# Patient Record
Sex: FEMALE | Race: OTHER | NOT HISPANIC OR LATINO | Employment: FULL TIME | URBAN - METROPOLITAN AREA
[De-identification: names, ages, dates, MRNs, and addresses within clinical notes are randomized per-mention and may not be internally consistent; named-entity substitution may affect disease eponyms.]

---

## 2021-10-12 ENCOUNTER — OFFICE VISIT (OUTPATIENT)
Dept: OBSTETRICS AND GYNECOLOGY | Facility: CLINIC | Age: 31
End: 2021-10-12

## 2021-10-12 VITALS
SYSTOLIC BLOOD PRESSURE: 124 MMHG | HEIGHT: 59 IN | DIASTOLIC BLOOD PRESSURE: 89 MMHG | WEIGHT: 176 LBS | BODY MASS INDEX: 35.48 KG/M2

## 2021-10-12 DIAGNOSIS — N94.6 DYSMENORRHEA: ICD-10-CM

## 2021-10-12 DIAGNOSIS — Z12.4 SCREENING FOR CERVICAL CANCER: ICD-10-CM

## 2021-10-12 DIAGNOSIS — N92.0 MENORRHAGIA WITH REGULAR CYCLE: ICD-10-CM

## 2021-10-12 DIAGNOSIS — Z01.419 WELL WOMAN EXAM: Primary | ICD-10-CM

## 2021-10-12 PROCEDURE — 99395 PREV VISIT EST AGE 18-39: CPT | Mod: S$GLB,,, | Performed by: OBSTETRICS & GYNECOLOGY

## 2021-10-12 PROCEDURE — 99395 PR PREVENTIVE VISIT,EST,18-39: ICD-10-PCS | Mod: S$GLB,,, | Performed by: OBSTETRICS & GYNECOLOGY

## 2021-10-12 RX ORDER — TRANEXAMIC ACID 650 MG/1
1300 TABLET ORAL 3 TIMES DAILY
Qty: 30 TABLET | Refills: 5 | Status: SHIPPED | OUTPATIENT
Start: 2021-10-12 | End: 2023-01-24

## 2021-10-15 ENCOUNTER — PATIENT MESSAGE (OUTPATIENT)
Dept: OBSTETRICS AND GYNECOLOGY | Facility: CLINIC | Age: 31
End: 2021-10-15

## 2021-11-03 ENCOUNTER — PATIENT MESSAGE (OUTPATIENT)
Dept: OBSTETRICS AND GYNECOLOGY | Facility: CLINIC | Age: 31
End: 2021-11-03

## 2022-03-16 ENCOUNTER — TELEPHONE (OUTPATIENT)
Dept: OBSTETRICS AND GYNECOLOGY | Facility: CLINIC | Age: 32
End: 2022-03-16

## 2022-03-16 NOTE — TELEPHONE ENCOUNTER
----- Message from Ivis Cheema sent at 3/16/2022 12:40 PM CDT -----  Regarding: Advice  Contact: patient  Patient would like the nurse to give her a call back concerning, the medication Dr Arellano told her she would have to take if she was planning on getting pregnant. Please call to advise at Ph .[phone] (home)

## 2022-03-16 NOTE — TELEPHONE ENCOUNTER
I spoke with Marylou, I told her that it is most likely the ovaboost that dr sena was suggesting.

## 2023-01-24 ENCOUNTER — OFFICE VISIT (OUTPATIENT)
Dept: OBSTETRICS AND GYNECOLOGY | Facility: CLINIC | Age: 33
End: 2023-01-24
Payer: COMMERCIAL

## 2023-01-24 VITALS
WEIGHT: 180 LBS | SYSTOLIC BLOOD PRESSURE: 142 MMHG | DIASTOLIC BLOOD PRESSURE: 89 MMHG | HEART RATE: 91 BPM | BODY MASS INDEX: 36.36 KG/M2

## 2023-01-24 DIAGNOSIS — Z12.4 SCREENING FOR MALIGNANT NEOPLASM OF THE CERVIX: ICD-10-CM

## 2023-01-24 DIAGNOSIS — Z01.419 WELL WOMAN EXAM WITH ROUTINE GYNECOLOGICAL EXAM: ICD-10-CM

## 2023-01-24 DIAGNOSIS — Z31.69 INFERTILITY COUNSELING: Primary | ICD-10-CM

## 2023-01-24 DIAGNOSIS — R87.612 LGSIL ON PAP SMEAR OF CERVIX: ICD-10-CM

## 2023-01-24 DIAGNOSIS — N97.0 INFERTILITY ASSOCIATED WITH ANOVULATION: ICD-10-CM

## 2023-01-24 PROCEDURE — 3077F PR MOST RECENT SYSTOLIC BLOOD PRESSURE >= 140 MM HG: ICD-10-PCS | Mod: CPTII,S$GLB,, | Performed by: OBSTETRICS & GYNECOLOGY

## 2023-01-24 PROCEDURE — 1159F MED LIST DOCD IN RCRD: CPT | Mod: CPTII,S$GLB,, | Performed by: OBSTETRICS & GYNECOLOGY

## 2023-01-24 PROCEDURE — 1159F PR MEDICATION LIST DOCUMENTED IN MEDICAL RECORD: ICD-10-PCS | Mod: CPTII,S$GLB,, | Performed by: OBSTETRICS & GYNECOLOGY

## 2023-01-24 PROCEDURE — 3008F BODY MASS INDEX DOCD: CPT | Mod: CPTII,S$GLB,, | Performed by: OBSTETRICS & GYNECOLOGY

## 2023-01-24 PROCEDURE — 99395 PR PREVENTIVE VISIT,EST,18-39: ICD-10-PCS | Mod: S$GLB,,, | Performed by: OBSTETRICS & GYNECOLOGY

## 2023-01-24 PROCEDURE — 3079F DIAST BP 80-89 MM HG: CPT | Mod: CPTII,S$GLB,, | Performed by: OBSTETRICS & GYNECOLOGY

## 2023-01-24 PROCEDURE — 3077F SYST BP >= 140 MM HG: CPT | Mod: CPTII,S$GLB,, | Performed by: OBSTETRICS & GYNECOLOGY

## 2023-01-24 PROCEDURE — 3008F PR BODY MASS INDEX (BMI) DOCUMENTED: ICD-10-PCS | Mod: CPTII,S$GLB,, | Performed by: OBSTETRICS & GYNECOLOGY

## 2023-01-24 PROCEDURE — 99395 PREV VISIT EST AGE 18-39: CPT | Mod: S$GLB,,, | Performed by: OBSTETRICS & GYNECOLOGY

## 2023-01-24 PROCEDURE — 3079F PR MOST RECENT DIASTOLIC BLOOD PRESSURE 80-89 MM HG: ICD-10-PCS | Mod: CPTII,S$GLB,, | Performed by: OBSTETRICS & GYNECOLOGY

## 2023-01-24 NOTE — PROGRESS NOTES
Subjective:       Patient ID: Marylou Almanza is a 32 y.o. female.    Chief Complaint: Well Woman    A 32-year-old  0 with a history of never using birth control on never been able to get pregnant she is  her  has 1 child from a previous marriage.  Her main complaint is severe excruciating pain with every period.  Her mom had similar periods had 2 children she is not sure she has painful bowel movements with the.  But she said everything hurts I have given her the options of trying medications for endometriosis or birth control pills for several months or being evaluated by a reproductive endocrinologist for possible laparoscopy.  With her history I think is a strong possibility of endometriosis    Review of Systems   Constitutional:  Negative for activity change, appetite change, chills, fever and unexpected weight change.   HENT:  Negative for nasal congestion, dental problem, facial swelling, hearing loss and mouth sores.    Respiratory:  Negative for apnea, chest tightness, shortness of breath and wheezing.    Cardiovascular:  Negative for chest pain and leg swelling.   Gastrointestinal:  Negative for abdominal distention, abdominal pain, anal bleeding, blood in stool, constipation, diarrhea, nausea, rectal pain and vomiting.   Genitourinary:  Positive for menstrual problem and pelvic pain. Negative for decreased urine volume, difficulty urinating, dyspareunia, dysuria, enuresis, flank pain, frequency, genital sores, hematuria, urgency, vaginal bleeding, vaginal discharge and vaginal pain.   Musculoskeletal:  Negative for arthralgias, back pain, joint swelling, myalgias and neck pain.   Integumentary:  Negative for color change, pallor, rash and wound.   Allergic/Immunologic: Negative for immunocompromised state.   Neurological:  Negative for dizziness, light-headedness and headaches.   Hematological:  Negative for adenopathy. Does not bruise/bleed easily.   Psychiatric/Behavioral:   Negative for agitation, behavioral problems, confusion, sleep disturbance and suicidal ideas. The patient is not nervous/anxious and is not hyperactive.        Objective:      Physical Exam  Constitutional:       Appearance: She is well-developed.   HENT:      Head: Normocephalic.      Nose: Nose normal.   Eyes:      Conjunctiva/sclera: Conjunctivae normal.      Pupils: Pupils are equal, round, and reactive to light.   Cardiovascular:      Rate and Rhythm: Normal rate and regular rhythm.      Heart sounds: Normal heart sounds.   Pulmonary:      Effort: Pulmonary effort is normal.      Breath sounds: Normal breath sounds.   Abdominal:      General: Bowel sounds are normal.      Palpations: Abdomen is soft.   Genitourinary:     Vagina: Normal.      Comments: Cervix and uterus very high no adnexal masses difficult to feel uterus sacral ligaments but vulva vagina bimanual essentially normal  Musculoskeletal:         General: Normal range of motion.      Cervical back: Normal range of motion and neck supple.   Skin:     General: Skin is warm and dry.   Neurological:      Mental Status: She is alert and oriented to person, place, and time.   Psychiatric:         Behavior: Behavior normal.         Thought Content: Thought content normal.     Breast exam normal  Assessment:       Problem List Items Addressed This Visit    None  Visit Diagnoses       Well woman exam with routine gynecological exam    -  Primary    Relevant Orders    Liquid-based pap smear, screening    Screening for malignant neoplasm of the cervix        Relevant Orders    Liquid-based pap smear, screening    LGSIL on Pap smear of cervix        Relevant Orders    Liquid-based pap smear, screening              Plan:         refer to Reproductive Endocrinology

## 2023-01-26 LAB
CHLAMYDIA: NEGATIVE
GONORRHEA: NEGATIVE
SOURCE: NORMAL
SOURCE: NORMAL
TRICHOMONAS AMPLIFIED: NEGATIVE

## 2023-01-27 LAB — Lab: NORMAL

## 2023-02-02 ENCOUNTER — PATIENT MESSAGE (OUTPATIENT)
Dept: OBSTETRICS AND GYNECOLOGY | Facility: CLINIC | Age: 33
End: 2023-02-02

## 2023-02-03 ENCOUNTER — PATIENT MESSAGE (OUTPATIENT)
Dept: OBSTETRICS AND GYNECOLOGY | Facility: CLINIC | Age: 33
End: 2023-02-03

## 2023-02-03 RX ORDER — ESTRADIOL 0.1 MG/G
1 CREAM VAGINAL DAILY
Qty: 42.5 G | Refills: 1 | Status: SHIPPED | OUTPATIENT
Start: 2023-02-03 | End: 2023-08-29

## 2023-02-06 ENCOUNTER — OUTSIDE PLACE OF SERVICE (OUTPATIENT)
Dept: UROLOGY | Facility: CLINIC | Age: 33
End: 2023-02-06

## 2023-02-06 PROCEDURE — 52332 CYSTOSCOPY AND TREATMENT: CPT | Mod: LT,,, | Performed by: UROLOGY

## 2023-02-06 PROCEDURE — 52332 PR CYSTOSCOPY,INSERT URETERAL STENT: ICD-10-PCS | Mod: LT,,, | Performed by: UROLOGY

## 2023-02-13 ENCOUNTER — PATIENT MESSAGE (OUTPATIENT)
Dept: OBSTETRICS AND GYNECOLOGY | Facility: CLINIC | Age: 33
End: 2023-02-13

## 2023-02-14 ENCOUNTER — OFFICE VISIT (OUTPATIENT)
Dept: UROLOGY | Facility: CLINIC | Age: 33
End: 2023-02-14
Payer: COMMERCIAL

## 2023-02-14 ENCOUNTER — TELEPHONE (OUTPATIENT)
Dept: UROLOGY | Facility: CLINIC | Age: 33
End: 2023-02-14

## 2023-02-14 VITALS — RESPIRATION RATE: 18 BRPM | HEIGHT: 59 IN | WEIGHT: 180 LBS | BODY MASS INDEX: 36.29 KG/M2

## 2023-02-14 DIAGNOSIS — N20.0 KIDNEY STONE: Primary | ICD-10-CM

## 2023-02-14 PROCEDURE — 1159F PR MEDICATION LIST DOCUMENTED IN MEDICAL RECORD: ICD-10-PCS | Mod: CPTII,S$GLB,, | Performed by: UROLOGY

## 2023-02-14 PROCEDURE — 99215 PR OFFICE/OUTPT VISIT, EST, LEVL V, 40-54 MIN: ICD-10-PCS | Mod: S$GLB,,, | Performed by: UROLOGY

## 2023-02-14 PROCEDURE — 3008F PR BODY MASS INDEX (BMI) DOCUMENTED: ICD-10-PCS | Mod: CPTII,S$GLB,, | Performed by: UROLOGY

## 2023-02-14 PROCEDURE — 99215 OFFICE O/P EST HI 40 MIN: CPT | Mod: S$GLB,,, | Performed by: UROLOGY

## 2023-02-14 PROCEDURE — 1160F PR REVIEW ALL MEDS BY PRESCRIBER/CLIN PHARMACIST DOCUMENTED: ICD-10-PCS | Mod: CPTII,S$GLB,, | Performed by: UROLOGY

## 2023-02-14 PROCEDURE — 1160F RVW MEDS BY RX/DR IN RCRD: CPT | Mod: CPTII,S$GLB,, | Performed by: UROLOGY

## 2023-02-14 PROCEDURE — 1159F MED LIST DOCD IN RCRD: CPT | Mod: CPTII,S$GLB,, | Performed by: UROLOGY

## 2023-02-14 PROCEDURE — 3008F BODY MASS INDEX DOCD: CPT | Mod: CPTII,S$GLB,, | Performed by: UROLOGY

## 2023-02-14 RX ORDER — OXYCODONE AND ACETAMINOPHEN 7.5; 325 MG/1; MG/1
1 TABLET ORAL EVERY 8 HOURS PRN
COMMUNITY
Start: 2023-02-08

## 2023-02-14 RX ORDER — PHENAZOPYRIDINE HYDROCHLORIDE 200 MG/1
200 TABLET, FILM COATED ORAL 3 TIMES DAILY PRN
Qty: 30 TABLET | Refills: 1 | Status: SHIPPED | OUTPATIENT
Start: 2023-02-14 | End: 2023-02-24

## 2023-02-14 RX ORDER — OXYBUTYNIN CHLORIDE 15 MG/1
15 TABLET, EXTENDED RELEASE ORAL DAILY
Qty: 30 TABLET | Refills: 11 | Status: SHIPPED | OUTPATIENT
Start: 2023-02-14

## 2023-02-14 RX ORDER — CIPROFLOXACIN 500 MG/1
500 TABLET ORAL EVERY 12 HOURS
COMMUNITY
Start: 2023-02-08 | End: 2023-02-15

## 2023-02-14 RX ORDER — PROMETHAZINE HYDROCHLORIDE 25 MG/1
25 TABLET ORAL EVERY 8 HOURS PRN
COMMUNITY
Start: 2023-02-08 | End: 2023-08-29

## 2023-02-14 NOTE — PROGRESS NOTES
Subjective:       Patient ID: Marylou Almanza is a 32 y.o. female.    Chief Complaint: New Pt, L URS consult, and L ureteral stent in place      HPI:  32-year-old female who underwent stent placement in the past for infected left ureteral stone here for follow-up and preop of formal stone procedure    Past Medical History:   Past Medical History:   Diagnosis Date    Renal stones        Past Surgical Historical: History reviewed. No pertinent surgical history.     Medications:   Medication List with Changes/Refills   New Medications    OXYBUTYNIN (DITROPAN XL) 15 MG TR24    Take 1 tablet (15 mg total) by mouth once daily.    PHENAZOPYRIDINE (PYRIDIUM) 200 MG TABLET    Take 1 tablet (200 mg total) by mouth 3 (three) times daily as needed for Pain.   Current Medications    CIPROFLOXACIN HCL (CIPRO) 500 MG TABLET    Take 500 mg by mouth every 12 (twelve) hours.    ESTRADIOL (ESTRACE) 0.01 % (0.1 MG/GRAM) VAGINAL CREAM    Place 1 g vaginally once daily.    OXYCODONE-ACETAMINOPHEN (PERCOCET) 7.5-325 MG PER TABLET    Take 1 tablet by mouth every 8 (eight) hours as needed.    PHENAZOPYRIDINE HCL (AZO ORAL)    Take by mouth.    PROMETHAZINE (PHENERGAN) 25 MG TABLET    Take 25 mg by mouth every 8 (eight) hours as needed.        Past Social History:   Social History     Socioeconomic History    Marital status: Single   Tobacco Use    Smoking status: Never    Smokeless tobacco: Never   Substance and Sexual Activity    Alcohol use: Yes    Drug use: Never    Sexual activity: Yes     Partners: Male     Birth control/protection: None       Allergies:   Review of patient's allergies indicates:   Allergen Reactions    Penicillins Palpitations        Family History:   Family History   Problem Relation Age of Onset    Diabetes Paternal Grandfather     Diabetes Paternal Grandmother     No Known Problems Maternal Grandmother     No Known Problems Maternal Grandfather     No Known Problems Father     No Known Problems Mother     No Known  Problems Brother     No Known Problems Sister     Stomach cancer Paternal Aunt 44        Review of Systems:  Review of Systems    Physical Exam:  Physical Exam    Assessment/Plan:       Problem List Items Addressed This Visit    None  Visit Diagnoses       Kidney stone    -  Primary    Relevant Orders    Ambulatory Referral to External Surgery               Left ureteral stone status post stent:  I personally reviewed her CT images we will proceed with ureteroscopy next available date

## 2023-02-14 NOTE — PROGRESS NOTES
Patient educated, consent signed, pre-admission paperwork given. Patient verbalized understanding. DOS 2/15/23 at [facility]. Paula

## 2023-02-14 NOTE — TELEPHONE ENCOUNTER
Pt calling  with pain due to stent. I will send pt ditropan and pyridium. Pt coming in now for URS consents.     ----- Message from Kylah Rodriguez sent at 2/14/2023  8:12 AM CST -----  Contact: self  Type:  Needs Medical Advice    Who Called: Marylou Almanza   Symptoms (please be specific):  pain so bad she can barely walk. has kidney stone- has a tube and every time she urinates it's painful in her groin   How long has patient had these symptoms:  3 days, but today it's so bad she can't walk    Pharmacy name and phone #:    [id number] - [place], [place] - [address] [address]  Phone: [phone] Fax: [phone]     Would the patient rather a call back or a response via [facility]?  Call back ASAP  Best Call Back Number: [phone]   Additional Information: n/a

## 2023-02-15 ENCOUNTER — OUTSIDE PLACE OF SERVICE (OUTPATIENT)
Dept: UROLOGY | Facility: CLINIC | Age: 33
End: 2023-02-15

## 2023-02-15 DIAGNOSIS — N20.0 KIDNEY STONE: Primary | ICD-10-CM

## 2023-02-15 LAB — B-HCG UR QL: NEGATIVE

## 2023-02-15 PROCEDURE — 74420 UROGRAPHY RTRGR +-KUB: CPT | Mod: 26,,, | Performed by: UROLOGY

## 2023-02-15 PROCEDURE — 74420 PR  X-RAY RETROGRADE PYELOGRAM: ICD-10-PCS | Mod: 26,,, | Performed by: UROLOGY

## 2023-02-15 PROCEDURE — 52352 PR CYSTO/URETERO/PYELOSCOPY, CALCULUS TX: ICD-10-PCS | Mod: LT,,, | Performed by: UROLOGY

## 2023-02-15 PROCEDURE — 52332 CYSTOSCOPY AND TREATMENT: CPT | Mod: 51,LT,, | Performed by: UROLOGY

## 2023-02-15 PROCEDURE — 52332 PR CYSTOSCOPY,INSERT URETERAL STENT: ICD-10-PCS | Mod: 51,LT,, | Performed by: UROLOGY

## 2023-02-15 PROCEDURE — 52352 CYSTOURETERO W/STONE REMOVE: CPT | Mod: LT,,, | Performed by: UROLOGY

## 2023-02-15 RX ORDER — HYDROCODONE BITARTRATE AND ACETAMINOPHEN 10; 325 MG/1; MG/1
1 TABLET ORAL EVERY 6 HOURS PRN
Qty: 15 TABLET | Refills: 0 | Status: SHIPPED | OUTPATIENT
Start: 2023-02-15 | End: 2023-08-29

## 2023-02-15 RX ORDER — CIPROFLOXACIN 500 MG/1
500 TABLET ORAL 2 TIMES DAILY
Qty: 6 TABLET | Refills: 0 | Status: SHIPPED | OUTPATIENT
Start: 2023-02-15 | End: 2023-02-18

## 2023-02-16 ENCOUNTER — PATIENT MESSAGE (OUTPATIENT)
Dept: UROLOGY | Facility: CLINIC | Age: 33
End: 2023-02-16

## 2023-02-19 LAB
CALCULI COMPOSITION: NORMAL
CALCULI DESCRIPTION: NORMAL
CALCULI MASS: 8 MG
CALCULI PDF: NORMAL

## 2023-02-28 ENCOUNTER — PATIENT MESSAGE (OUTPATIENT)
Dept: OBSTETRICS AND GYNECOLOGY | Facility: CLINIC | Age: 33
End: 2023-02-28

## 2023-02-28 ENCOUNTER — PATIENT MESSAGE (OUTPATIENT)
Dept: UROLOGY | Facility: CLINIC | Age: 33
End: 2023-02-28

## 2023-03-01 ENCOUNTER — TELEPHONE (OUTPATIENT)
Dept: UROLOGY | Facility: CLINIC | Age: 33
End: 2023-03-01

## 2023-03-02 ENCOUNTER — PROCEDURE VISIT (OUTPATIENT)
Dept: UROLOGY | Facility: CLINIC | Age: 33
End: 2023-03-02
Payer: COMMERCIAL

## 2023-03-02 VITALS
BODY MASS INDEX: 35.53 KG/M2 | DIASTOLIC BLOOD PRESSURE: 65 MMHG | WEIGHT: 181 LBS | HEART RATE: 76 BPM | HEIGHT: 60 IN | SYSTOLIC BLOOD PRESSURE: 123 MMHG

## 2023-03-02 DIAGNOSIS — N20.0 KIDNEY STONE: ICD-10-CM

## 2023-03-02 PROCEDURE — 52310 CYSTOSCOPY AND TREATMENT: CPT | Mod: S$GLB,,, | Performed by: UROLOGY

## 2023-03-02 PROCEDURE — 52310 CYSTOSCOPY: ICD-10-PCS | Mod: S$GLB,,, | Performed by: UROLOGY

## 2023-03-02 NOTE — PROCEDURES
Cystoscopy    Date/Time: 3/2/2023 3:50 PM  Performed by: Mohit Kwon MD  Authorized by: Mohit Kwon MD     Consent Done?:  Yes (Written)  Timeout: prior to procedure the correct patient, procedure, and site was verified    Prep: patient was prepped and draped in usual sterile fashion    Anesthesia:  Intraurethral instillation  Position:  Supine  Anesthesia:  Intraurethral instillation  Patient sedated?: No    Preparation: Patient was prepped and draped in usual sterile fashion    Scope type:  Flexible cystoscope  Stent removed: Yes     Patient tolerated the procedure well with no immediate complications  Comments:      Patient was brought to the procedure room placed on the table in spine position. The patient was prepped and draped in the usual sterile fashion. The cystoscope was inserted into the urethra advanced the urethra and bladder were normal the stent was visualized it was grasped and removed the bladder was inspected found to be free of tumor stone or foreign body.  The patient tolerated the procedure well there were no complications

## 2023-03-08 ENCOUNTER — PATIENT MESSAGE (OUTPATIENT)
Dept: UROLOGY | Facility: CLINIC | Age: 33
End: 2023-03-08

## 2023-06-06 ENCOUNTER — PATIENT MESSAGE (OUTPATIENT)
Dept: OBSTETRICS AND GYNECOLOGY | Facility: CLINIC | Age: 33
End: 2023-06-06
Payer: MEDICAID

## 2023-06-07 ENCOUNTER — TELEPHONE (OUTPATIENT)
Dept: OBSTETRICS AND GYNECOLOGY | Facility: CLINIC | Age: 33
End: 2023-06-07
Payer: MEDICAID

## 2023-06-07 NOTE — TELEPHONE ENCOUNTER
Pt needing to reschedule her ultrasound appointment so her  can be in attendance. Ultrasound scheduled for 6/9 @ 9:30am. Pt v/u    ----- Message from Starla Candelario sent at 6/7/2023 11:32 AM CDT -----  Type:  Needs Medical Advice    Who Called: Marylou Almanza  Symptoms (please be specific): -   How long has patient had these symptoms:  -  Pharmacy name and phone #:  -  Would the patient rather a call back or a response via [facility]?    Best Call Back Number: [phone]    Additional Information:  pt needs to reschedule US for Friday ( so  can accompany her ) please call

## 2023-06-09 ENCOUNTER — PROCEDURE VISIT (OUTPATIENT)
Dept: OBSTETRICS AND GYNECOLOGY | Facility: CLINIC | Age: 33
End: 2023-06-09
Payer: MEDICAID

## 2023-06-09 DIAGNOSIS — Z34.01 ENCOUNTER FOR SUPERVISION OF NORMAL FIRST PREGNANCY IN FIRST TRIMESTER: ICD-10-CM

## 2023-06-09 DIAGNOSIS — Z34.01 ENCOUNTER FOR SUPERVISION OF NORMAL FIRST PREGNANCY IN FIRST TRIMESTER: Primary | ICD-10-CM

## 2023-06-09 PROCEDURE — 76817 US OB/GYN PROCEDURE (VIEWPOINT): ICD-10-PCS | Mod: S$GLB,,, | Performed by: STUDENT IN AN ORGANIZED HEALTH CARE EDUCATION/TRAINING PROGRAM

## 2023-06-09 PROCEDURE — 76817 TRANSVAGINAL US OBSTETRIC: CPT | Mod: S$GLB,,, | Performed by: STUDENT IN AN ORGANIZED HEALTH CARE EDUCATION/TRAINING PROGRAM

## 2023-06-12 ENCOUNTER — PATIENT MESSAGE (OUTPATIENT)
Dept: OBSTETRICS AND GYNECOLOGY | Facility: CLINIC | Age: 33
End: 2023-06-12
Payer: MEDICAID

## 2023-06-12 ENCOUNTER — TELEPHONE (OUTPATIENT)
Dept: OBSTETRICS AND GYNECOLOGY | Facility: CLINIC | Age: 33
End: 2023-06-12
Payer: MEDICAID

## 2023-06-12 NOTE — TELEPHONE ENCOUNTER
Pt calling to switch her NOB ultrasound to a later date because she stated when she went in for her first ultrasound they were unable to see the baby and stated that it was too early to see anything. She stated that she was thinking about switching doctors because of it, but decided to stay with . I informed her that we normally do ultrasounds at 8 weeks to make sure that everything is visible and able to be seen. I informed her that she did not have to change her ultrasound appointment because she will be far enough along for them to see. Pt v/u    ----- Message from Judy Buck sent at 6/12/2023  1:01 PM CDT -----  Contact: pt  Pt calling about appt and prenatal victims and she can be reached at [phone].  Pt calling about rescheduling ultrasound.     Thanks,

## 2023-07-10 ENCOUNTER — PATIENT MESSAGE (OUTPATIENT)
Dept: OBSTETRICS AND GYNECOLOGY | Facility: CLINIC | Age: 33
End: 2023-07-10
Payer: MEDICAID

## 2023-07-11 ENCOUNTER — TELEPHONE (OUTPATIENT)
Dept: OBSTETRICS AND GYNECOLOGY | Facility: CLINIC | Age: 33
End: 2023-07-11
Payer: MEDICAID

## 2023-07-11 NOTE — TELEPHONE ENCOUNTER
Pt called to reschedule her NOB ultrasound. I scheduled her for 7/18/2023 @ 3:30pm. Pt v/u.    ----- Message from Cindy Doty sent at 7/11/2023  8:47 AM CDT -----  Regarding: appt access  Contact: pt  Pt is calling to reschedule her US. Please call back at [phone]//thank you acc

## 2023-07-13 ENCOUNTER — TELEPHONE (OUTPATIENT)
Dept: OBSTETRICS AND GYNECOLOGY | Facility: CLINIC | Age: 33
End: 2023-07-13
Payer: MEDICAID

## 2023-07-13 NOTE — TELEPHONE ENCOUNTER
Pt stated that she has had a little bit of spotting. She states that she is having no pain at this time and she feels fine.  I did inform her that if she felt that she needed to be seen that she can come into the ER. Pt v/u.    ----- Message from Antionette Wells sent at 7/13/2023 11:00 AM CDT -----  Contact: self  Type:  Same Day Appointment Request  Caller is requesting a same day appointment.  Caller declined first available appointment listed below.    Name of Caller:Marylou Almanza  When is the first available appointment?na  Symptoms:9 weeks pregnant and is passing light pink color blood.  Best Call Back Number:[phone]  Additional Information: na  Dr Orville Davison  MRN:[id number]

## 2023-07-18 DIAGNOSIS — Z34.01 ENCOUNTER FOR SUPERVISION OF NORMAL FIRST PREGNANCY IN FIRST TRIMESTER: Primary | ICD-10-CM

## 2023-07-19 ENCOUNTER — PATIENT MESSAGE (OUTPATIENT)
Dept: OBSTETRICS AND GYNECOLOGY | Facility: CLINIC | Age: 33
End: 2023-07-19
Payer: MEDICAID

## 2023-07-19 ENCOUNTER — TELEPHONE (OUTPATIENT)
Dept: OBSTETRICS AND GYNECOLOGY | Facility: CLINIC | Age: 33
End: 2023-07-19
Payer: MEDICAID

## 2023-07-19 NOTE — TELEPHONE ENCOUNTER
Pt states that she had a miscarriage on Monday and went to the hospital and they stated that she needed to see us. Pt states that she is having a lot of bleeding and pain, she is currently taking 11 Aleve a day. I informed her that I will speak with  further on what he would like to do and let her know what he says/ Pt v/u.    ----- Message from Nakia Hurtado sent at 7/19/2023  8:32 AM CDT -----  Contact: self  Pt is calling in regards to a miscarriage she had on Monday . Pt is in a lot of pain and is needing to know if its normal for her. She also states she is bleeding a lot and has tried to reach out through the [facility] being that she is taking a lot of medication. She believes its not good for her kidneys and would like for a nurse to reach out to her at [phone] .

## 2023-07-20 ENCOUNTER — OFFICE VISIT (OUTPATIENT)
Dept: OBSTETRICS AND GYNECOLOGY | Facility: CLINIC | Age: 33
End: 2023-07-20
Payer: MEDICAID

## 2023-07-20 ENCOUNTER — PROCEDURE VISIT (OUTPATIENT)
Dept: OBSTETRICS AND GYNECOLOGY | Facility: CLINIC | Age: 33
End: 2023-07-20
Payer: MEDICAID

## 2023-07-20 VITALS
HEART RATE: 81 BPM | WEIGHT: 192 LBS | SYSTOLIC BLOOD PRESSURE: 138 MMHG | HEIGHT: 60 IN | DIASTOLIC BLOOD PRESSURE: 84 MMHG | BODY MASS INDEX: 37.69 KG/M2

## 2023-07-20 DIAGNOSIS — O03.9 MISCARRIAGE: ICD-10-CM

## 2023-07-20 DIAGNOSIS — O03.9 COMPLETE ABORTION: Primary | ICD-10-CM

## 2023-07-20 DIAGNOSIS — O03.9 MISCARRIAGE: Primary | ICD-10-CM

## 2023-07-20 PROCEDURE — 76801 OB US < 14 WKS SINGLE FETUS: CPT | Mod: S$GLB,,, | Performed by: STUDENT IN AN ORGANIZED HEALTH CARE EDUCATION/TRAINING PROGRAM

## 2023-07-20 PROCEDURE — 3008F PR BODY MASS INDEX (BMI) DOCUMENTED: ICD-10-PCS | Mod: CPTII,S$GLB,, | Performed by: STUDENT IN AN ORGANIZED HEALTH CARE EDUCATION/TRAINING PROGRAM

## 2023-07-20 PROCEDURE — 3008F BODY MASS INDEX DOCD: CPT | Mod: CPTII,S$GLB,, | Performed by: STUDENT IN AN ORGANIZED HEALTH CARE EDUCATION/TRAINING PROGRAM

## 2023-07-20 PROCEDURE — 3079F DIAST BP 80-89 MM HG: CPT | Mod: CPTII,S$GLB,, | Performed by: STUDENT IN AN ORGANIZED HEALTH CARE EDUCATION/TRAINING PROGRAM

## 2023-07-20 PROCEDURE — 3079F PR MOST RECENT DIASTOLIC BLOOD PRESSURE 80-89 MM HG: ICD-10-PCS | Mod: CPTII,S$GLB,, | Performed by: STUDENT IN AN ORGANIZED HEALTH CARE EDUCATION/TRAINING PROGRAM

## 2023-07-20 PROCEDURE — 76801 US OB/GYN PROCEDURE (VIEWPOINT): ICD-10-PCS | Mod: S$GLB,,, | Performed by: STUDENT IN AN ORGANIZED HEALTH CARE EDUCATION/TRAINING PROGRAM

## 2023-07-20 PROCEDURE — 99213 PR OFFICE/OUTPT VISIT, EST, LEVL III, 20-29 MIN: ICD-10-PCS | Mod: TH,25,S$GLB, | Performed by: STUDENT IN AN ORGANIZED HEALTH CARE EDUCATION/TRAINING PROGRAM

## 2023-07-20 PROCEDURE — 1160F PR REVIEW ALL MEDS BY PRESCRIBER/CLIN PHARMACIST DOCUMENTED: ICD-10-PCS | Mod: CPTII,S$GLB,, | Performed by: STUDENT IN AN ORGANIZED HEALTH CARE EDUCATION/TRAINING PROGRAM

## 2023-07-20 PROCEDURE — 1159F MED LIST DOCD IN RCRD: CPT | Mod: CPTII,S$GLB,, | Performed by: STUDENT IN AN ORGANIZED HEALTH CARE EDUCATION/TRAINING PROGRAM

## 2023-07-20 PROCEDURE — 3075F SYST BP GE 130 - 139MM HG: CPT | Mod: CPTII,S$GLB,, | Performed by: STUDENT IN AN ORGANIZED HEALTH CARE EDUCATION/TRAINING PROGRAM

## 2023-07-20 PROCEDURE — 99213 OFFICE O/P EST LOW 20 MIN: CPT | Mod: TH,25,S$GLB, | Performed by: STUDENT IN AN ORGANIZED HEALTH CARE EDUCATION/TRAINING PROGRAM

## 2023-07-20 PROCEDURE — 1159F PR MEDICATION LIST DOCUMENTED IN MEDICAL RECORD: ICD-10-PCS | Mod: CPTII,S$GLB,, | Performed by: STUDENT IN AN ORGANIZED HEALTH CARE EDUCATION/TRAINING PROGRAM

## 2023-07-20 PROCEDURE — 1160F RVW MEDS BY RX/DR IN RCRD: CPT | Mod: CPTII,S$GLB,, | Performed by: STUDENT IN AN ORGANIZED HEALTH CARE EDUCATION/TRAINING PROGRAM

## 2023-07-20 PROCEDURE — 3075F PR MOST RECENT SYSTOLIC BLOOD PRESS GE 130-139MM HG: ICD-10-PCS | Mod: CPTII,S$GLB,, | Performed by: STUDENT IN AN ORGANIZED HEALTH CARE EDUCATION/TRAINING PROGRAM

## 2023-07-20 NOTE — PROGRESS NOTES
Chief Complaint: Miscarriage    HPI: Patient is a 32 y.o. y.o. female  who presents to GYN clinic for miscarriage.  Patient previously seen for ultrasound was found to have any gestational sac noted in the uterus approximately 5 weeks gestation.  She reports going to the ED recently for abdominal pain and was told she was having miscarriage.  She has or increased vaginal bleeding today.  On cramping.  Repeat ultrasound performed revealing no gestational sac in the uterus.  Patient was counseled if she had passed the pregnancy and is still having a bleeding from miscarriage.  She has no other complaints today..    Review of Systems   Constitutional:  Negative for chills and fever.   HENT:  Negative for congestion and sore throat.    Respiratory:  Negative for cough and shortness of breath.    Cardiovascular:  Negative for chest pain and palpitations.   Gastrointestinal:  Negative for abdominal pain, nausea and vomiting.   Genitourinary:  Negative for dysuria, frequency and urgency.   Musculoskeletal:  Negative for back pain.   Skin:  Negative for itching and rash.   Neurological:  Negative for headaches.   All other systems reviewed and are negative.    PMH: kidney stones  PSH: none  Meds: none  NKDA  Obhx:   GYNhx: denies abnormal pap smears, denies STD's  Social hx: denies t/d/e  Family hx: denies breast, colon, ovarian or uterine cancers.     Physical Exam:  Vitals:    23 1108   BP: 138/84   Pulse: 81   Weight: 87.1 kg (192 lb)   Height: 5' (1.524 m)   Body mass index is 37.5 kg/m².    Gen: NAD, well developed, well nourished, obese  Psych: alert and oriented x 3, normal affect  HEENT: normocephalic, atraumatic  Abd: soft, non-tender, non-distended  Skin: warm and dry  Ext: no c/c/c, moves all extremities  Neurological: normal gait, gross motor function intact    Assessment: Patient is a 32 y.o. y.o. female  with      Plan:  Patient with complete , no products of conception noted  on ultrasound today  Patient counseled on taking ibuprofen or Aleve for pain   Patient counseled and return to clinic if any bleeding or pain worsened in   She return to clinic 4 weeks  Orville Davison MD

## 2023-08-29 ENCOUNTER — OFFICE VISIT (OUTPATIENT)
Dept: OBSTETRICS AND GYNECOLOGY | Facility: CLINIC | Age: 33
End: 2023-08-29
Payer: MEDICAID

## 2023-08-29 VITALS
HEART RATE: 80 BPM | DIASTOLIC BLOOD PRESSURE: 79 MMHG | WEIGHT: 186 LBS | BODY MASS INDEX: 36.33 KG/M2 | SYSTOLIC BLOOD PRESSURE: 142 MMHG

## 2023-08-29 DIAGNOSIS — O03.9 MISCARRIAGE: Primary | ICD-10-CM

## 2023-08-29 PROCEDURE — 3077F PR MOST RECENT SYSTOLIC BLOOD PRESSURE >= 140 MM HG: ICD-10-PCS | Mod: CPTII,S$GLB,, | Performed by: OBSTETRICS & GYNECOLOGY

## 2023-08-29 PROCEDURE — 1159F MED LIST DOCD IN RCRD: CPT | Mod: CPTII,S$GLB,, | Performed by: OBSTETRICS & GYNECOLOGY

## 2023-08-29 PROCEDURE — 3008F BODY MASS INDEX DOCD: CPT | Mod: CPTII,S$GLB,, | Performed by: OBSTETRICS & GYNECOLOGY

## 2023-08-29 PROCEDURE — 1159F PR MEDICATION LIST DOCUMENTED IN MEDICAL RECORD: ICD-10-PCS | Mod: CPTII,S$GLB,, | Performed by: OBSTETRICS & GYNECOLOGY

## 2023-08-29 PROCEDURE — 3078F PR MOST RECENT DIASTOLIC BLOOD PRESSURE < 80 MM HG: ICD-10-PCS | Mod: CPTII,S$GLB,, | Performed by: OBSTETRICS & GYNECOLOGY

## 2023-08-29 PROCEDURE — 99212 OFFICE O/P EST SF 10 MIN: CPT | Mod: S$GLB,,, | Performed by: OBSTETRICS & GYNECOLOGY

## 2023-08-29 PROCEDURE — 3077F SYST BP >= 140 MM HG: CPT | Mod: CPTII,S$GLB,, | Performed by: OBSTETRICS & GYNECOLOGY

## 2023-08-29 PROCEDURE — 3078F DIAST BP <80 MM HG: CPT | Mod: CPTII,S$GLB,, | Performed by: OBSTETRICS & GYNECOLOGY

## 2023-08-29 PROCEDURE — 99212 PR OFFICE/OUTPT VISIT, EST, LEVL II, 10-19 MIN: ICD-10-PCS | Mod: S$GLB,,, | Performed by: OBSTETRICS & GYNECOLOGY

## 2023-08-29 PROCEDURE — 3008F PR BODY MASS INDEX (BMI) DOCUMENTED: ICD-10-PCS | Mod: CPTII,S$GLB,, | Performed by: OBSTETRICS & GYNECOLOGY

## 2023-08-29 RX ORDER — NORETHINDRONE ACETATE AND ETHINYL ESTRADIOL 1MG-20(21)
1 KIT ORAL DAILY
Qty: 30 TABLET | Refills: 11 | Status: SHIPPED | OUTPATIENT
Start: 2023-08-29 | End: 2024-08-28

## 2023-08-29 NOTE — PROGRESS NOTES
A 33-year-old female here for birth control she just had a miscarriage several months ago did discuss the pros and cons of miscarriage she is having no problems now wants to start on birth control pills therefore will start her on Loestrin described the pack gave her 2 Jeremy packs give her starting literature will with her and will start her

## 2023-08-30 RX ORDER — PHENTERMINE HYDROCHLORIDE 37.5 MG/1
37.5 TABLET ORAL
Qty: 30 TABLET | Refills: 1 | Status: SHIPPED | OUTPATIENT
Start: 2023-08-30 | End: 2023-10-30 | Stop reason: SDUPTHER

## 2023-09-08 ENCOUNTER — PATIENT MESSAGE (OUTPATIENT)
Dept: OBSTETRICS AND GYNECOLOGY | Facility: CLINIC | Age: 33
End: 2023-09-08
Payer: MEDICAID

## 2023-09-28 ENCOUNTER — PATIENT MESSAGE (OUTPATIENT)
Dept: OBSTETRICS AND GYNECOLOGY | Facility: CLINIC | Age: 33
End: 2023-09-28
Payer: MEDICAID

## 2023-10-31 ENCOUNTER — PATIENT MESSAGE (OUTPATIENT)
Dept: OBSTETRICS AND GYNECOLOGY | Facility: CLINIC | Age: 33
End: 2023-10-31
Payer: MEDICAID

## 2023-11-06 RX ORDER — PHENTERMINE HYDROCHLORIDE 37.5 MG/1
37.5 TABLET ORAL
Qty: 30 TABLET | Refills: 1 | Status: SHIPPED | OUTPATIENT
Start: 2023-11-06 | End: 2023-12-27 | Stop reason: SDUPTHER

## 2023-11-10 ENCOUNTER — PATIENT MESSAGE (OUTPATIENT)
Dept: OBSTETRICS AND GYNECOLOGY | Facility: CLINIC | Age: 33
End: 2023-11-10
Payer: MEDICAID

## 2023-11-14 ENCOUNTER — PATIENT MESSAGE (OUTPATIENT)
Dept: OBSTETRICS AND GYNECOLOGY | Facility: CLINIC | Age: 33
End: 2023-11-14
Payer: MEDICAID

## 2023-11-30 ENCOUNTER — PATIENT MESSAGE (OUTPATIENT)
Dept: OBSTETRICS AND GYNECOLOGY | Facility: CLINIC | Age: 33
End: 2023-11-30
Payer: MEDICAID

## 2023-12-26 ENCOUNTER — PATIENT MESSAGE (OUTPATIENT)
Dept: OBSTETRICS AND GYNECOLOGY | Facility: CLINIC | Age: 33
End: 2023-12-26
Payer: MEDICAID

## 2023-12-27 DIAGNOSIS — R63.5 WEIGHT GAIN: Primary | ICD-10-CM

## 2023-12-27 RX ORDER — PHENTERMINE HYDROCHLORIDE 37.5 MG/1
37.5 TABLET ORAL
Qty: 30 TABLET | Refills: 1 | Status: SHIPPED | OUTPATIENT
Start: 2023-12-27 | End: 2023-12-28 | Stop reason: SDUPTHER

## 2023-12-28 DIAGNOSIS — R63.5 WEIGHT GAIN: ICD-10-CM

## 2023-12-28 RX ORDER — PHENTERMINE HYDROCHLORIDE 37.5 MG/1
18.75 TABLET ORAL
Qty: 30 TABLET | Refills: 0 | Status: SHIPPED | OUTPATIENT
Start: 2023-12-28 | End: 2024-02-26

## 2024-06-20 ENCOUNTER — OFFICE VISIT (OUTPATIENT)
Dept: OBSTETRICS AND GYNECOLOGY | Facility: CLINIC | Age: 34
End: 2024-06-20
Payer: MEDICAID

## 2024-06-20 DIAGNOSIS — Z00.00 PHYSICAL EXAM, ANNUAL: Primary | ICD-10-CM

## 2024-06-20 NOTE — PROGRESS NOTES
Subjective     Patient ID: Marylou Almanza is a 33 y.o. female.    Chief Complaint:  Annual Exam      History of Present Illness  S a 33-year-old  1 para 0 spontaneous AB1 who is regular periods Adipex          GYN & OB History  No LMP recorded.   Date of Last Pap: Last Colonoscopy completed on     OB History    Para Term  AB Living   1 0 0 0 1 0   SAB IAB Ectopic Multiple Live Births   1 0 0 0 0      # Outcome Date GA Lbr Irineo/2nd Weight Sex Type Anes PTL Lv   1 SAB                Review of Systems  Review of Systems       Objective   Physical Exam:   Constitutional: She appears well-developed and well-nourished. No distress.    HENT:   Head: Normocephalic and atraumatic.    Eyes: Conjunctivae and EOM are normal.    Neck: No tracheal deviation present. No thyromegaly present.    Cardiovascular:       Exam reveals no clubbing, no cyanosis and no edema.        Pulmonary/Chest: Effort normal. No respiratory distress.        Abdominal: Soft. She exhibits no distension and no mass. There is no abdominal tenderness. There is no rebound and no guarding. No hernia.     Genitourinary:    Vagina, uterus and rectum normal.      Pelvic exam was performed with patient supine.   There is no rash, tenderness, lesion or injury on the right labia. There is no rash, tenderness, lesion or injury on the left labia. Cervix is normal. Right adnexum displays no mass, no tenderness and no fullness. Left adnexum displays no mass, no tenderness and no fullness.                Skin: She is not diaphoretic. No cyanosis. Nails show no clubbing.             Assessment and Plan       Check blood pressure at home     Plan:  Refill Adipex if blood pressure normal

## 2024-07-16 RX ORDER — PHENTERMINE HYDROCHLORIDE 37.5 MG/1
37.5 TABLET ORAL
Qty: 1 TABLET | Refills: 0 | OUTPATIENT
Start: 2024-07-16 | End: 2024-08-15

## 2024-07-16 NOTE — TELEPHONE ENCOUNTER
Advised patient I do not prescribe this medication and she needs to contact her prescribing physician.

## 2024-10-11 ENCOUNTER — TELEPHONE (OUTPATIENT)
Dept: OBSTETRICS AND GYNECOLOGY | Facility: CLINIC | Age: 34
End: 2024-10-11
Payer: MEDICAID

## 2024-10-11 ENCOUNTER — PATIENT MESSAGE (OUTPATIENT)
Dept: OBSTETRICS AND GYNECOLOGY | Facility: CLINIC | Age: 34
End: 2024-10-11

## 2024-10-11 NOTE — TELEPHONE ENCOUNTER
----- Message from DePhilz Coffeeterra sent at 10/11/2024  9:29 AM CDT -----  Contact: self [phone]  Type:  Needs Medical Advice    Who Called: Marylou   Symptoms (please be specific): pregnancy    Would the patient rather a call back or a response via [facility]? Call back   Best Call Back Number: [phone]  Additional Information:   n/a

## 2024-10-11 NOTE — TELEPHONE ENCOUNTER
Spoke with patient. She wanted to see Dr. Zepeda. Informed her that we do not switch within the clinic. Got her information for Dr. Davison to review. Patient verbalized understanding.    Wants to transfer care from [facility].

## 2024-10-14 ENCOUNTER — TELEPHONE (OUTPATIENT)
Dept: OBSTETRICS AND GYNECOLOGY | Facility: CLINIC | Age: 34
End: 2024-10-14

## 2024-10-14 NOTE — TELEPHONE ENCOUNTER
Patient calling to schedule her NOB u/s and appointment. Appt scheduled for 10/17/2024. Pt v/u      ----- Message from Candi sent at 10/14/2024  9:07 AM CDT -----   Patient is requesting a call back at [phone]

## 2024-10-16 DIAGNOSIS — Z34.01 ENCOUNTER FOR SUPERVISION OF NORMAL FIRST PREGNANCY IN FIRST TRIMESTER: Primary | ICD-10-CM

## 2024-10-17 ENCOUNTER — ROUTINE PRENATAL (OUTPATIENT)
Dept: OBSTETRICS AND GYNECOLOGY | Facility: CLINIC | Age: 34
End: 2024-10-17

## 2024-10-17 ENCOUNTER — PROCEDURE VISIT (OUTPATIENT)
Dept: OBSTETRICS AND GYNECOLOGY | Facility: CLINIC | Age: 34
End: 2024-10-17

## 2024-10-17 VITALS
SYSTOLIC BLOOD PRESSURE: 138 MMHG | WEIGHT: 183.81 LBS | DIASTOLIC BLOOD PRESSURE: 73 MMHG | HEART RATE: 102 BPM | BODY MASS INDEX: 35.9 KG/M2

## 2024-10-17 DIAGNOSIS — Z3A.15 15 WEEKS GESTATION OF PREGNANCY: ICD-10-CM

## 2024-10-17 DIAGNOSIS — Z34.01 ENCOUNTER FOR SUPERVISION OF NORMAL FIRST PREGNANCY IN FIRST TRIMESTER: ICD-10-CM

## 2024-10-17 DIAGNOSIS — Z34.82 ENCOUNTER FOR SUPERVISION OF OTHER NORMAL PREGNANCY IN SECOND TRIMESTER: Primary | ICD-10-CM

## 2024-10-17 PROBLEM — Z34.92 ENCOUNTER FOR SUPERVISION OF NORMAL PREGNANCY IN SECOND TRIMESTER: Status: ACTIVE | Noted: 2024-10-17

## 2024-10-17 NOTE — PROGRESS NOTES
CC: Initial OB visit    HPI:  34 y.o. at 15w3d with EDC of 2025, by 15w Ultrasound.  Complains of heartburn. She is not taking meds.    ROS:  Negative unless mentioned above    PMH: kidney stones  PSH: none  Meds: none  OB Hx: : SAB   G2: current  SOC: denies S/E/D   FH: denies family history of congenital defects, mental retardation, twins, cystic fibrosis, Down's syndrome, sickle cell, NTD's, cleft lip/palate, cardiac defects, abdominal wall defects, GYN cancers   GYN Hx: no past history STD's,  Negative History of HSV,  Negative History of Abnormal PAP  Review of patient's allergies indicates:   Allergen Reactions    Hydrocodone     Penicillins Palpitations     PHYSICAL EXAM  Prenatal Vitals  BP: 138/73  Weight: 83.4 kg (183 lb 12.8 oz)  Urine Glucose: Negative  Urine Albumin: Negative    Body mass index is 35.9 kg/m².    Wt Readings from Last 3 Encounters:   10/17/24 83.4 kg (183 lb 12.8 oz)   23 84.4 kg (186 lb)   23 87.1 kg (192 lb)     General: NAD, well developed, well nourished  Psych: alert and oriented to person, time and place, normal affect  HEENT: normocephalic, atraumatic  Gravid, soft, NT  Skin: warm, dry  Neuro: normal gait, gross motor function intact  Pelvic: NEFG. Normal vaginal mucosa, pink/ruggated, no lesions or discharge. Cvx closed, nonfriable  No cyanosis, clubbing or edema    FHT's: + on US    PNL reviewed: wnl     ASSESSMENT: Patient is a 34 y.o.  at 15w3d with  Patient Active Problem List   Diagnosis    Encounter for supervision of normal pregnancy in second trimester     PLAN:  Pt reports having NIPT at OSH, will get BARRON signed today for records  AFP today  PNL, GC/CZ, PAP - utd  PNV, Iron  Pain, Fever, Bleeding Precautions  WILIAN, TAHIRA, PreE precautions  Encouraged Breast feeding  F/U in 4 weeks

## 2024-10-21 ENCOUNTER — PATIENT MESSAGE (OUTPATIENT)
Dept: OTHER | Facility: OTHER | Age: 34
End: 2024-10-21

## 2024-10-26 ENCOUNTER — PATIENT MESSAGE (OUTPATIENT)
Dept: OBSTETRICS AND GYNECOLOGY | Facility: CLINIC | Age: 34
End: 2024-10-26

## 2024-10-28 ENCOUNTER — PATIENT MESSAGE (OUTPATIENT)
Dept: OTHER | Facility: OTHER | Age: 34
End: 2024-10-28

## 2024-11-06 ENCOUNTER — PATIENT MESSAGE (OUTPATIENT)
Dept: OBSTETRICS AND GYNECOLOGY | Facility: CLINIC | Age: 34
End: 2024-11-06
Payer: MEDICAID

## 2024-11-14 ENCOUNTER — ROUTINE PRENATAL (OUTPATIENT)
Dept: OBSTETRICS AND GYNECOLOGY | Facility: CLINIC | Age: 34
End: 2024-11-14
Payer: MEDICAID

## 2024-11-14 VITALS
SYSTOLIC BLOOD PRESSURE: 112 MMHG | WEIGHT: 187.38 LBS | HEART RATE: 85 BPM | BODY MASS INDEX: 36.6 KG/M2 | DIASTOLIC BLOOD PRESSURE: 74 MMHG

## 2024-11-14 DIAGNOSIS — Z3A.19 19 WEEKS GESTATION OF PREGNANCY: ICD-10-CM

## 2024-11-14 DIAGNOSIS — Z34.82 ENCOUNTER FOR SUPERVISION OF OTHER NORMAL PREGNANCY IN SECOND TRIMESTER: Primary | ICD-10-CM

## 2024-11-14 PROCEDURE — 99213 OFFICE O/P EST LOW 20 MIN: CPT | Mod: S$PBB,TH,, | Performed by: STUDENT IN AN ORGANIZED HEALTH CARE EDUCATION/TRAINING PROGRAM

## 2024-11-14 NOTE — PROGRESS NOTES
CC: Follow-Up OB    HPI:   34 y.o.  at 19w3d with EDC of 2025, by Ultrasound, here for her follow up OB visit. Complains of nothing today.    Pregnancy ROS:  Negative leakage of fluid   Negative vaginal bleeding   Negative headache, vision changes, RUQ pain, epigastric pain  Negative contractions/abdominal pain   Negative pelvic pressure     Physical Exam:  Prenatal Vitals  BP: 112/74  Weight: 85 kg (187 lb 6.4 oz)  Urine Glucose: Negative  Urine Albumin: Negative    Wt Readings from Last 3 Encounters:   24 85 kg (187 lb 6.4 oz)   10/17/24 83.4 kg (183 lb 12.8 oz)   23 84.4 kg (186 lb)       Body mass index is 36.6 kg/m².    General: NAD, well developed, well nourished  Psych: alert and oriented to person, time and place, normal affect  HEENT: normocephalic, atraumatic  Abd: Gravid, soft, NT, ND  Skin: warm, dry  Neuro: normal gait, gross motor function intact  No cyanosis, clubbing or edema    FHTs: +    Maternal Blood Type: A+/-    ASSESSMENT: 34 y.o.  @ 19w3d with   Patient Active Problem List   Diagnosis    Encounter for supervision of normal pregnancy in second trimester       PLAN:  NIPT results - viewed from pt phone, negative  AFP today  Anatomy scan on RTC  Pain, fever, bleeding precautions  WILIAN, TAHIRA, PreE precautions  Cont PNV, Iron  Return to clinic in 4 weeks

## 2024-11-18 ENCOUNTER — PATIENT MESSAGE (OUTPATIENT)
Dept: OTHER | Facility: OTHER | Age: 34
End: 2024-11-18
Payer: MEDICAID

## 2024-12-05 ENCOUNTER — PATIENT MESSAGE (OUTPATIENT)
Dept: OBSTETRICS AND GYNECOLOGY | Facility: CLINIC | Age: 34
End: 2024-12-05
Payer: MEDICAID

## 2024-12-10 ENCOUNTER — PATIENT MESSAGE (OUTPATIENT)
Dept: OBSTETRICS AND GYNECOLOGY | Facility: CLINIC | Age: 34
End: 2024-12-10
Payer: MEDICAID

## 2024-12-11 ENCOUNTER — PATIENT MESSAGE (OUTPATIENT)
Dept: OBSTETRICS AND GYNECOLOGY | Facility: CLINIC | Age: 34
End: 2024-12-11
Payer: MEDICAID

## 2024-12-13 ENCOUNTER — ROUTINE PRENATAL (OUTPATIENT)
Dept: OBSTETRICS AND GYNECOLOGY | Facility: CLINIC | Age: 34
End: 2024-12-13
Payer: MEDICAID

## 2024-12-13 VITALS
BODY MASS INDEX: 39.26 KG/M2 | DIASTOLIC BLOOD PRESSURE: 89 MMHG | SYSTOLIC BLOOD PRESSURE: 146 MMHG | HEART RATE: 94 BPM | WEIGHT: 201 LBS

## 2024-12-13 DIAGNOSIS — Z3A.23 23 WEEKS GESTATION OF PREGNANCY: Primary | ICD-10-CM

## 2024-12-13 DIAGNOSIS — Z34.82 ENCOUNTER FOR SUPERVISION OF OTHER NORMAL PREGNANCY IN SECOND TRIMESTER: ICD-10-CM

## 2024-12-13 NOTE — PROGRESS NOTES
CC: Follow-Up OB    HPI:   34 y.o.  at 23w4d with EDC of 2025, by Ultrasound, here for her follow up OB visit. Complains of LE swelling.    Pregnancy ROS:  Positive fetal movement   Negative leakage of fluid   Negative vaginal bleeding   Negative headache, vision changes, RUQ pain, epigastric pain  Negative contractions/abdominal pain   Negative pelvic pressure     Physical Exam:  Prenatal Vitals  BP: (!) 146/89  Weight: 91.2 kg (201 lb)  Urine Glucose: Negative  Urine Albumin: Negative    Wt Readings from Last 3 Encounters:   24 91.2 kg (201 lb)   24 85 kg (187 lb 6.4 oz)   10/17/24 83.4 kg (183 lb 12.8 oz)       Body mass index is 39.26 kg/m².    General: NAD, well developed, well nourished  Psych: alert and oriented to person, time and place, normal affect  HEENT: normocephalic, atraumatic  Abd: Gravid, soft, NT, ND  Skin: warm, dry  Neuro: normal gait, gross motor function intact  No cyanosis, clubbing, + LE edema    FHTs: +    Maternal Blood Type: A+/-    ASSESSMENT: 34 y.o.  @ 23w4d with   Patient Active Problem List   Diagnosis    Encounter for supervision of normal pregnancy in second trimester       PLAN:  Anatomy scan  [place] on RTC  PreE labs today, BP elevated   Counseled on compression socks and elevating her legs when at rest for edema  Pain, fever, bleeding precautions  WILIAN, TAHIRA, PreE precautions  Cont PNV, Iron  Return to clinic in 1 weeks

## 2024-12-16 ENCOUNTER — PATIENT MESSAGE (OUTPATIENT)
Dept: OTHER | Facility: OTHER | Age: 34
End: 2024-12-16

## 2024-12-17 ENCOUNTER — PATIENT MESSAGE (OUTPATIENT)
Dept: OBSTETRICS AND GYNECOLOGY | Facility: CLINIC | Age: 34
End: 2024-12-17
Payer: MEDICAID

## 2024-12-18 ENCOUNTER — OUTSIDE PLACE OF SERVICE (OUTPATIENT)
Dept: OBSTETRICS AND GYNECOLOGY | Facility: CLINIC | Age: 34
End: 2024-12-18

## 2024-12-20 ENCOUNTER — PATIENT MESSAGE (OUTPATIENT)
Dept: OBSTETRICS AND GYNECOLOGY | Facility: CLINIC | Age: 34
End: 2024-12-20

## 2024-12-21 ENCOUNTER — OUTSIDE PLACE OF SERVICE (OUTPATIENT)
Dept: OBSTETRICS AND GYNECOLOGY | Facility: CLINIC | Age: 34
End: 2024-12-21

## 2024-12-23 ENCOUNTER — PATIENT MESSAGE (OUTPATIENT)
Dept: OBSTETRICS AND GYNECOLOGY | Facility: CLINIC | Age: 34
End: 2024-12-23

## 2024-12-26 ENCOUNTER — POSTPARTUM VISIT (OUTPATIENT)
Dept: OBSTETRICS AND GYNECOLOGY | Facility: CLINIC | Age: 34
End: 2024-12-26

## 2024-12-26 VITALS — WEIGHT: 184 LBS | SYSTOLIC BLOOD PRESSURE: 132 MMHG | BODY MASS INDEX: 35.94 KG/M2 | DIASTOLIC BLOOD PRESSURE: 92 MMHG

## 2024-12-26 DIAGNOSIS — Z98.891 S/P C-SECTION: Primary | ICD-10-CM

## 2024-12-26 DIAGNOSIS — O14.12 SEVERE PRE-ECLAMPSIA IN SECOND TRIMESTER: ICD-10-CM

## 2024-12-26 PROCEDURE — 99024 POSTOP FOLLOW-UP VISIT: CPT | Mod: ,,, | Performed by: STUDENT IN AN ORGANIZED HEALTH CARE EDUCATION/TRAINING PROGRAM

## 2024-12-26 RX ORDER — FUROSEMIDE 20 MG/1
20 TABLET ORAL DAILY
Qty: 3 TABLET | Refills: 0 | Status: SHIPPED | OUTPATIENT
Start: 2024-12-26 | End: 2024-12-29

## 2024-12-26 NOTE — PROGRESS NOTES
Chief Complaint: post op    HPI: Patient is a 34 y.o. y.o. female  who presents to clinic for 1 week postop  visit.  Patient was delivered secondary to severe preeclampsia with uncontrolled hypertension despite antihypertensive medication.  She reports today she is still having some pain around her incision, she is only taking the Percocet and labetalol for medications at this time.  She reports otherwise she is doing well.  She does report some lower extremity swelling.  She has no other complaints today.    Physical Exam:  Vitals:    24 0923   BP: (!) 132/92   Weight: 83.5 kg (184 lb)     Gen: NAD, well developed, well nourished  Psych: alert and oriented x 3, normal affect  HEENT: normocephalic, atraumatic  Abd: soft, non-tender, non-distended, incision healing well, no signs of infection  Skin: warm and dry  Ext: no c/c, moves all extremities, positive edema in the lower extremities  Neurological: normal gait, gross motor function intact    Results:  Pathology report scanned into media      Assessment: Patient is a 34 y.o. y.o. female  with  Patient Active Problem List   Diagnosis    Encounter for supervision of normal pregnancy in second trimester       Plan:  Patient counseled on her pathology results today patient voices understanding  Blood pressure control medication, will continue labetalol 400 mg TID at this time  Patient counseled on taking ibuprofen for pain and using the Percocet as needed  Will prescribe Lasix 20 mg daily x3 days to assist with her lower extremity edema   Return to clinic in 1 week follow up    Orville Davison MD

## 2025-01-03 ENCOUNTER — POSTPARTUM VISIT (OUTPATIENT)
Dept: OBSTETRICS AND GYNECOLOGY | Facility: CLINIC | Age: 35
End: 2025-01-03

## 2025-01-03 VITALS
BODY MASS INDEX: 35.58 KG/M2 | SYSTOLIC BLOOD PRESSURE: 125 MMHG | DIASTOLIC BLOOD PRESSURE: 82 MMHG | HEART RATE: 82 BPM | WEIGHT: 182.19 LBS

## 2025-01-03 DIAGNOSIS — Z98.891 STATUS POST C-SECTION: Primary | ICD-10-CM

## 2025-01-03 DIAGNOSIS — O14.12 SEVERE PRE-ECLAMPSIA IN SECOND TRIMESTER: ICD-10-CM

## 2025-01-03 RX ORDER — LABETALOL 100 MG/1
TABLET, FILM COATED ORAL
COMMUNITY
Start: 2024-12-22

## 2025-01-03 NOTE — PROGRESS NOTES
Chief Complaint: post op/Severe PreE    HPI: Patient is a 34 y.o. y.o. female  who presents to clinic for follow up.  Patient is status post  at 24 weeks secondary to severe preeclampsia with uncontrolled blood pressure despite medication.  Patient reports she is doing well today.  Blood pressure is controlled now with medication will decrease medication today.  She is interested in starting contraception so, she desires the pill.  She also concerned about her decreasing breastfeeding however she does report she has not been eating well recently.  Recommended patient see her lactation consultant.  She has no other complaints today.  Review of systems negative unless mentioned above..    Physical Exam:  Vitals:    25 [id number]   BP: 125/82   Pulse: 82   Weight: 82.6 kg (182 lb 3.2 oz)     Gen: NAD, well developed, well nourished  Psych: alert and oriented x 3, normal affect  HEENT: normocephalic, atraumatic  Abd: soft, non-tender, non-distended  Skin: warm and dry  Ext: no c/c/c, moves all extremities  Neurological: normal gait, gross motor function intact    Assessment: Patient is a 34 y.o. y.o. female  with  Patient Active Problem List   Diagnosis    Encounter for supervision of normal pregnancy in second trimester    Severe pre-eclampsia in second trimester    Status post      Plan:  Patient doing well postop   Blood pressure controlled today   Will decrease labetalol to 100 mg BID  Patient to continue postop restrictions   We discussed different forms of contraception today, patient electing for a pill, discuss we will start this medication after she returns for her 6 week visit  Patient return to clinic in 4 weeks for 6 week postpartum visit    Orville Davison MD

## 2025-01-06 ENCOUNTER — PATIENT MESSAGE (OUTPATIENT)
Dept: OBSTETRICS AND GYNECOLOGY | Facility: CLINIC | Age: 35
End: 2025-01-06

## 2025-01-10 ENCOUNTER — PATIENT MESSAGE (OUTPATIENT)
Dept: OBSTETRICS AND GYNECOLOGY | Facility: CLINIC | Age: 35
End: 2025-01-10

## 2025-02-13 ENCOUNTER — PATIENT MESSAGE (OUTPATIENT)
Dept: OBSTETRICS AND GYNECOLOGY | Facility: CLINIC | Age: 35
End: 2025-02-13

## 2025-03-17 ENCOUNTER — PATIENT MESSAGE (OUTPATIENT)
Dept: OBSTETRICS AND GYNECOLOGY | Facility: CLINIC | Age: 35
End: 2025-03-17